# Patient Record
(demographics unavailable — no encounter records)

---

## 2024-10-09 NOTE — HISTORY OF PRESENT ILLNESS
[Home] : at home, [unfilled] , at the time of the visit. [Medical Office: ([facility])___] : at the medical office located in  [Verbal consent obtained from patient] : the patient, [unfilled] [de-identified] : 24 M presenting for follow up visit. Lab results reviewed with patient over the phone. Patient verbalized understanding and all questions answered. Patient states he is not entirely compliant with methimazole and takes 4 tablets/day instead of the 6 tablets as prescribed.

## 2024-12-03 NOTE — HEALTH RISK ASSESSMENT
[de-identified] : none [de-identified] : none [de-identified] : pt is active [de-identified] : normal

## 2024-12-03 NOTE — HISTORY OF PRESENT ILLNESS
[de-identified] : 25-year-old male with hyperthyroidism presenting today for follow-up visit.  He has been compliant with propranolol however he has not been compliant with methimazole.  He reports excessive sweating at work otherwise denies any cardiac or pulmonary symptoms.

## 2024-12-30 NOTE — PHYSICAL EXAM
[Alert] : alert [No Acute Distress] : no acute distress [Normal Sclera/Conjunctiva] : normal sclera/conjunctiva [EOMI] : extra ocular movement intact [No Proptosis] : no proptosis [Normal Oropharynx] : the oropharynx was normal [No Thyroid Nodules] : no palpable thyroid nodules [No Respiratory Distress] : no respiratory distress [No Accessory Muscle Use] : no accessory muscle use [Clear to Auscultation] : lungs were clear to auscultation bilaterally [Normal S1, S2] : normal S1 and S2 [Normal Rate] : heart rate was normal [Regular Rhythm] : with a regular rhythm [No Edema] : no peripheral edema [Pedal Pulses Normal] : the pedal pulses are present [Normal Bowel Sounds] : normal bowel sounds [Not Tender] : non-tender [Not Distended] : not distended [Soft] : abdomen soft [Normal Anterior Cervical Nodes] : no anterior cervical lymphadenopathy [Normal Posterior Cervical Nodes] : no posterior cervical lymphadenopathy [No Spinal Tenderness] : no spinal tenderness [Spine Straight] : spine straight [No Stigmata of Cushings Syndrome] : no stigmata of Cushings Syndrome [Normal Gait] : normal gait [Normal Strength/Tone] : muscle strength and tone were normal [No Rash] : no rash [Normal Reflexes] : deep tendon reflexes were 2+ and symmetric [No Tremors] : no tremors [Oriented x3] : oriented to person, place, and time [Acanthosis Nigricans] : no acanthosis nigricans [de-identified] : thin male  [de-identified] : no stare  [de-identified] : prominent thyroid

## 2024-12-30 NOTE — REASON FOR VISIT
[Initial Evaluation] : an initial evaluation [Hyperthyroidism] : hyperthyroidism [Language Line ] : provided by Language Line   [Time Spent: ____ minutes] : Total time spent using  services: [unfilled] minutes. The patient's primary language is not English thus required  services. [Interpreters_IDNumber] : [id number] [TWNoteComboBox1] : Com[place]an

## 2024-12-30 NOTE — ASSESSMENT
[Methimazole Therapy/PTU Therapy] : Risks and benefits of methimazole therapy/PTU therapy were discussed with the patient, including rash, liver dysfunction, and agranulocytosis. Patient was instructed to call the office for flu-like symptoms (e.g. fever and sore-throat) [FreeTextEntry1] : Hyperthyroidism: Grave's Disease vs Thyroiditis  Clinically appear mildly hyperthyroid Current Medication Dosage: 10mg TID (5mg tablets)- emphasized compliance to properly adjust dosing  follow up repeat TFTs. Hx. of thyroid nodules? unknown  f/u US Thyroid  Spoke to patient about course of achieving euthyroid state. Then determine possibility of remission or need for HOLGUIN ablation vs thyroidectomy. Risks of hyperthyroidism explained, and importance of medication compliance emphasized.

## 2024-12-30 NOTE — HISTORY OF PRESENT ILLNESS
[FreeTextEntry1] : HPI: 25 year old male presenting for hyperthyroidism evaluation   PMHx:   Allergies:     THYROID DISEASE HISTORY:   Risk Factors:   Family or Personal Hx of thyroid disease? reports about 2 year history of hyperthyroidism Goiter or Hx of Goiter? unsure but feels his tonsils are sometimes big Prior of current use of thyroid medication? methimazole 10mg TID , propranolol 80mg Hx of autoimmune disease? unknown but reports mother has thyroid disease as well Recent iodine exposure? denies   Clinical Symptoms:   Hyperthyroidism: fatigue, heat intolerance, anxiety at times, initially had tremors but are now improved.    LIFESTYLE FACTORS:   Eating patterns and weight history:  sometimes skips a meal but not often, trouble losing weight despite eating well  Activity/Sleep:         Follow up care: PCP: Dr. Macario        Surgical History: per chart    Family History: DM- Thyroid- mother - unsure if hyper or hypothyroidism  Autoimmune- Cancer- Other-   Social History: occupation- not working right now, usually physical work when he does take a job  support system-  ETOH use- smoking for 2 years, 5 cigarettes Tobacco Hx.- weekly  Additional substance use-   Additional Medications: OTC vitamins and supplements: no

## 2025-03-03 NOTE — REASON FOR VISIT
Information for new patients/Parents  Jenifer Feliz M.D.    About me:    Medical Training:  Medical School:  [employer], [id number]  Pediatric Residency:  [facility], [id number]  Board Certified in Pediatrics, 2009  Board certification means that I am held to certain standards of training and performance as measured through periodic examinations by the American Board of Pediatrics  I see children from birth through age 18 years  Faculty Appointments:  [facility] (newborns)    My take on Medicine:    Pediatrics is all about prevention (Guidance regarding health, nutrition, growth and development, safety, and dental care)  With that said, it’s important to know that I am pro-vaccine  There are still many deadly diseases that we have not gotten rid of (measles and polio are very scary!); you just haven’t seen them because we have vaccines to prevent them.  Vaccines do not cause Autism.  Antibiotics treat bacteria, not viruses; I use them when necessary based on your child’s exam/symptoms (please trust my judgment).  I believe ADD (attention deficit disorder)/ADHD (attention deficit hyperactivity disorder) is a real diagnosis and the majority of cases eventually need medical treatment for improvement.  I am not opposed to complimentary alternative medicine…as long as it is not harmful to your child.  I want to partner with you in taking care of your child.  Let me help you by providing you with all of the information you need to raise a happy, healthy child (we need to listen to each other, and in some cases may need to compromise).       When the baby is born:    The hospital will call me; all you need to do is give them my name (no paperwork necessary)  I will come to the hospital to see you and the baby within 24 hours  **If it is a weekend or holiday and I am not on call, one of my partners may be covering for me  [place] visits in the first  year:  3-5 days after discharge  1 week and 2 weeks  2, 4, 6, 9, and 12 months  Immunizations start at 2 months  [place] Immunization Registry (WIR):  https://[email or web address]/[email or web address]    Helpful Resources:    American Academy of Pediatrics website:  www.aap.org, www.healthychildren.org   Your Baby’s First Year by David Martins MD  Caring for your Baby and Young Child by David Martins MD  [email or web address] (breastfeeding information)  [email or web address]     How the Clinic works:    My Office Hours:  Monday: 8:00 AM-5:00 PM   Tuesday: 9:00 AM-1:00 PM  Wednesday 9:00 AM-5:00 PM  Thursday: OFF, my partners cover if you need to be seen   Friday: 8:00 AM-4:00 PM      [address]ations[address]  [address]  [address] [place] [address], 4th [place] [address]  Phone:  [phone]           Phone Calls:  I can be reached during the day by calling my office number (as listed above).  Please ask to speak with my Medical Assistant.  Many of your questions may be easily answered by her; however, if you wish to speak to me, you can leave me a message.  Your call will be returned the same day (unless it is a Thursday, when I am not in the office).  As a general rule, if you call in the morning, your call will be returned by 1 PM, if you call in the afternoon, your call will be returned by the end of my work day.  Also, consider signing up for [employer].  Then you can send me messages through your child’s chart.    Appointments:    For same day appointments, call the office number, not central scheduling (phones open at 8:30 am)    After Hours (for urgent matters that cannot wait until the following business day):    Call your doctor’s regular office phone number and you will be transferred to the answering service.  Your call will be returned by the Pediatrician who is on call for your doctor (24 hours a day, 7 days a week)  Our clinic policy is that we will not call in prescriptions (especially narcotics  or antibiotics) after hours; if you think your child is that sick, he/she needs to be seen by a doctor      Urgent Care:  After Hours Walk-in Urgent Care is available at multiple locations.  Please check the website for details.  [email or web address]    [Follow - Up] : a follow-up visit [Hyperthyroidism] : hyperthyroidism

## 2025-03-04 NOTE — ASSESSMENT
[Methimazole Therapy/PTU Therapy] : Risks and benefits of methimazole therapy/PTU therapy were discussed with the patient, including rash, liver dysfunction, and agranulocytosis. Patient was instructed to call the office for flu-like symptoms (e.g. fever and sore-throat) [FreeTextEntry1] : Hyperthyroidism: Grave's Disease  +goiter on exam +TSI and TRAB Clinically appear more euthyroid Current Medication Dosage: 20mg BID (10 mg tablets)- emphasized compliance to properly adjust dosing  Will keep on current dose at this time follow up repeat TFTs c/w propranolol PRN Hx. of thyroid nodules? unknown but with +goiter f/u US Thyroid- ordered in Dec 2024, has not done yet  Spoke to patient about course of achieving euthyroid state. Then determine possibility of remission or need for HOLGUIN ablation vs thyroidectomy. Risks of hyperthyroidism explained, and importance of medication compliance emphasized.

## 2025-03-04 NOTE — HISTORY OF PRESENT ILLNESS
[FreeTextEntry1] : THYROID DISEASE HISTORY:   Risk Factors:   Family or Personal Hx of thyroid disease? reports about 2 year history of hyperthyroidism Goiter or Hx of Goiter? unsure but feels his tonsils are sometimes big Prior of current use of thyroid medication? methimazole 10mg TID , propranolol 80mg Hx of autoimmune disease? unknown but reports mother has thyroid disease as well Recent iodine exposure? denies   Clinical Symptoms:   Hyperthyroidism: fatigue, heat intolerance, anxiety at times, initially had tremors but are now improved.     Interval: Was increased to 20mg BID last visit as he was still hyperthyroid on 10mg BID.   Dec 2024: TSH <0.001 Free T4: 2.2 Has gained 9 pounds since December.  Has been taking Propranolol 80mg QD.  States overall he is feeling better. Does not feel as anxious.     Follow up care: PCP: Dr. Macario

## 2025-03-04 NOTE — PHYSICAL EXAM
[Alert] : alert [No Acute Distress] : no acute distress [Normal Sclera/Conjunctiva] : normal sclera/conjunctiva [EOMI] : extra ocular movement intact [No Proptosis] : no proptosis [Normal Oropharynx] : the oropharynx was normal [No Thyroid Nodules] : no palpable thyroid nodules [No Respiratory Distress] : no respiratory distress [No Accessory Muscle Use] : no accessory muscle use [Clear to Auscultation] : lungs were clear to auscultation bilaterally [Normal S1, S2] : normal S1 and S2 [Normal Rate] : heart rate was normal [Regular Rhythm] : with a regular rhythm [No Edema] : no peripheral edema [Pedal Pulses Normal] : the pedal pulses are present [Normal Bowel Sounds] : normal bowel sounds [Not Tender] : non-tender [Not Distended] : not distended [Soft] : abdomen soft [Normal Anterior Cervical Nodes] : no anterior cervical lymphadenopathy [No Spinal Tenderness] : no spinal tenderness [Spine Straight] : spine straight [No Stigmata of Cushings Syndrome] : no stigmata of Cushings Syndrome [Normal Gait] : normal gait [Normal Strength/Tone] : muscle strength and tone were normal [No Rash] : no rash [Acanthosis Nigricans] : no acanthosis nigricans [Normal Reflexes] : deep tendon reflexes were 2+ and symmetric [No Tremors] : no tremors [Oriented x3] : oriented to person, place, and time [de-identified] : thin male  [de-identified] : no stare  [de-identified] : prominent thyroid, no nodules palpated

## 2025-03-04 NOTE — END OF VISIT
Pt notified [Time Spent: ___ minutes] : I have spent [unfilled] minutes of time on the encounter which excludes teaching and separately reported services.

## 2025-03-04 NOTE — PHYSICAL EXAM
[Alert] : alert [No Acute Distress] : no acute distress [Normal Sclera/Conjunctiva] : normal sclera/conjunctiva [EOMI] : extra ocular movement intact [No Proptosis] : no proptosis [Normal Oropharynx] : the oropharynx was normal [No Thyroid Nodules] : no palpable thyroid nodules [No Respiratory Distress] : no respiratory distress [No Accessory Muscle Use] : no accessory muscle use [Clear to Auscultation] : lungs were clear to auscultation bilaterally [Normal S1, S2] : normal S1 and S2 [Normal Rate] : heart rate was normal [Regular Rhythm] : with a regular rhythm [No Edema] : no peripheral edema [Pedal Pulses Normal] : the pedal pulses are present [Normal Bowel Sounds] : normal bowel sounds [Not Tender] : non-tender [Not Distended] : not distended [Soft] : abdomen soft [Normal Anterior Cervical Nodes] : no anterior cervical lymphadenopathy [No Spinal Tenderness] : no spinal tenderness [Spine Straight] : spine straight [No Stigmata of Cushings Syndrome] : no stigmata of Cushings Syndrome [Normal Gait] : normal gait [Normal Strength/Tone] : muscle strength and tone were normal [No Rash] : no rash [Acanthosis Nigricans] : no acanthosis nigricans [Normal Reflexes] : deep tendon reflexes were 2+ and symmetric [No Tremors] : no tremors [Oriented x3] : oriented to person, place, and time [de-identified] : thin male  [de-identified] : no stare  [de-identified] : prominent thyroid, no nodules palpated